# Patient Record
Sex: MALE | Race: BLACK OR AFRICAN AMERICAN | NOT HISPANIC OR LATINO | URBAN - METROPOLITAN AREA
[De-identification: names, ages, dates, MRNs, and addresses within clinical notes are randomized per-mention and may not be internally consistent; named-entity substitution may affect disease eponyms.]

---

## 2021-09-16 ENCOUNTER — OFFICE (OUTPATIENT)
Dept: URBAN - METROPOLITAN AREA CLINIC 11 | Facility: CLINIC | Age: 21
End: 2021-09-16

## 2021-09-16 VITALS
OXYGEN SATURATION: 99 % | HEIGHT: 73 IN | SYSTOLIC BLOOD PRESSURE: 116 MMHG | DIASTOLIC BLOOD PRESSURE: 72 MMHG | WEIGHT: 220 LBS | HEART RATE: 57 BPM

## 2021-09-16 DIAGNOSIS — R10.9 UNSPECIFIED ABDOMINAL PAIN: ICD-10-CM

## 2021-09-16 PROCEDURE — 99204 OFFICE O/P NEW MOD 45 MIN: CPT | Performed by: INTERNAL MEDICINE

## 2021-09-16 RX ORDER — PANTOPRAZOLE SODIUM 40 MG/1
40 TABLET, DELAYED RELEASE ORAL
Qty: 30 | Refills: 3 | Status: COMPLETED
Start: 2021-09-16 | End: 2021-11-09

## 2021-09-16 NOTE — SERVICENOTES
We reviewed his symptoms and labs from June.  His pain seems more lower mid abdomen but is associated with nausea.  We discussed a dif dx including issues with his hernia, gastritis, MJA related nausea/dyspepsia, biliary issues, urinary issues etc...  I will start him on Protonix, check labs, and do the CT.  With his elevated creatinine from earlier, if it is still elevated he may need a nephrology referral.  If the CT is negative, he may need an EGD as well. He agreed to proceed.

## 2021-09-16 NOTE — SERVICEHPINOTES
He present stating that he had an umbilical hernia repair and then last year with a diastasis recti followed by a "pinched nerve in my stomach".  He stated that he has had a occasional sharp pain and sometimes a cramping pain in the mid abdomen at about his umbilcus over the past 4 weeks. The pain does not radiate to his sides or back.  It is sometimes better with stooling. This has increased over the past 2 weeks.  He stated that it comes and goes through out the day.  He has not noted an issue with eating. The pain seems to starts on it own and occurs until he goes to sleep.  He has had nausea but not vomiting.  The nausea is fairly constant but it does wax and wane.  He has not been taking any meds.  He has been using mja about once a day (which is less than in the past). The MJA does help with the pain and nausea. He has been having normal stools lately once daily.  He has had some increased gas issues.He has not had dysuria. He had labs at [facility] in June which were reviewed on his phone. He had a normal CMP except the creatinine was 1.35. He was to have an u/s in [place] but it was not completed.

## 2021-09-28 ENCOUNTER — OFFICE (OUTPATIENT)
Dept: URBAN - METROPOLITAN AREA CLINIC 22 | Facility: CLINIC | Age: 21
End: 2021-09-28

## 2021-09-28 DIAGNOSIS — R10.9 UNSPECIFIED ABDOMINAL PAIN: ICD-10-CM

## 2021-09-28 PROCEDURE — 74177 CT ABD & PELVIS W/CONTRAST: CPT | Mod: TC | Performed by: INTERNAL MEDICINE

## 2021-11-09 ENCOUNTER — OFFICE (OUTPATIENT)
Dept: URBAN - METROPOLITAN AREA CLINIC 11 | Facility: CLINIC | Age: 21
End: 2021-11-09

## 2021-11-09 VITALS
HEART RATE: 59 BPM | WEIGHT: 216 LBS | OXYGEN SATURATION: 99 % | SYSTOLIC BLOOD PRESSURE: 132 MMHG | DIASTOLIC BLOOD PRESSURE: 81 MMHG | HEIGHT: 73 IN

## 2021-11-09 DIAGNOSIS — R10.9 UNSPECIFIED ABDOMINAL PAIN: ICD-10-CM

## 2021-11-09 DIAGNOSIS — R74.9 ABNORMAL SERUM ENZYME LEVEL, UNSPECIFIED: ICD-10-CM

## 2021-11-09 DIAGNOSIS — R11.0 NAUSEA: ICD-10-CM

## 2021-11-09 PROCEDURE — 99214 OFFICE O/P EST MOD 30 MIN: CPT | Performed by: INTERNAL MEDICINE

## 2021-11-09 NOTE — SERVICEHPINOTES
He presents for f/u of his abdominal pain.  He stated that he has continued to have issues with alternating diarrhea and constipation and "multiple different pains, lasting varying amounts of time, it can be dull, it can be cramping, it can be sharp".  He has had a benign.  He was having issues while at [place] prior to his return to [place] with symptoms back to about August. He has noted some nausea but not vomiting.  He has the nausea, gas, and pain. He has not had a particular trigger for his symptoms.  He stated that the diarrhea occurred a few times, "barely ever".   
br
br He had a negative CT of the abdomen/pelvis.
br
br He had mildly elevated LFTS with AST/ALT of 55/100 with normal bili and alk phos. 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  His chemistries were normal as were his CBC.  He had a UTI with strep viridans and was treated with antibiotics in September.  He has not had dysuria.

## 2021-11-09 NOTE — SERVICENOTES
We discussed his symptoms and that his issues seem to be more dyspeptic symptoms than that of diarrhea/constipation.  We reviewed his labs/studies, discussed a dif dx including ulcer disease, gastritis, dyspepsia, stress related issues. ARIA relate difficulties, etc... and evlauation by EGD.  He agreed to proceed.  As to his UTI, he has been treated and we reviewed his labs.  With the lack of diarrhea, I would hold on an FSC.  We reviewed his abnormal LFTs, potential causes, his ETOH use (binge about once a month), and f/u serologies.